# Patient Record
Sex: MALE | Race: WHITE
[De-identification: names, ages, dates, MRNs, and addresses within clinical notes are randomized per-mention and may not be internally consistent; named-entity substitution may affect disease eponyms.]

---

## 2021-06-15 ENCOUNTER — APPOINTMENT (OUTPATIENT)
Dept: OTOLARYNGOLOGY | Facility: CLINIC | Age: 61
End: 2021-06-15
Payer: COMMERCIAL

## 2021-06-15 VITALS
SYSTOLIC BLOOD PRESSURE: 155 MMHG | HEART RATE: 72 BPM | DIASTOLIC BLOOD PRESSURE: 94 MMHG | TEMPERATURE: 97.2 F | HEIGHT: 69 IN | BODY MASS INDEX: 33.33 KG/M2 | WEIGHT: 225 LBS

## 2021-06-15 DIAGNOSIS — H61.20 IMPACTED CERUMEN, UNSPECIFIED EAR: ICD-10-CM

## 2021-06-15 PROBLEM — Z00.00 ENCOUNTER FOR PREVENTIVE HEALTH EXAMINATION: Status: ACTIVE | Noted: 2021-06-15

## 2021-06-15 PROCEDURE — 69210 REMOVE IMPACTED EAR WAX UNI: CPT

## 2021-06-15 PROCEDURE — 99072 ADDL SUPL MATRL&STAF TM PHE: CPT

## 2021-06-15 PROCEDURE — 99203 OFFICE O/P NEW LOW 30 MIN: CPT | Mod: 25

## 2021-06-15 NOTE — REVIEW OF SYSTEMS
[Snoring With Pauses] : snoring with pauses [Patient Intake Form Reviewed] : Patient intake form was reviewed

## 2021-06-15 NOTE — PHYSICAL EXAM
[de-identified] : LEFT IMPACTED CERUMEN REMOVED/ HEARING IMPROVED [Normal] : mucosa is normal [Midline] : trachea located in midline position

## 2022-05-02 ENCOUNTER — APPOINTMENT (OUTPATIENT)
Dept: ULTRASOUND IMAGING | Facility: CLINIC | Age: 62
End: 2022-05-02
Payer: COMMERCIAL

## 2022-05-02 ENCOUNTER — APPOINTMENT (OUTPATIENT)
Dept: RADIOLOGY | Facility: CLINIC | Age: 62
End: 2022-05-02
Payer: COMMERCIAL

## 2022-05-02 PROCEDURE — 71046 X-RAY EXAM CHEST 2 VIEWS: CPT

## 2022-05-02 PROCEDURE — 76700 US EXAM ABDOM COMPLETE: CPT

## 2023-03-11 ENCOUNTER — APPOINTMENT (OUTPATIENT)
Dept: ULTRASOUND IMAGING | Facility: CLINIC | Age: 63
End: 2023-03-11
Payer: COMMERCIAL

## 2023-03-11 ENCOUNTER — APPOINTMENT (OUTPATIENT)
Dept: RADIOLOGY | Facility: CLINIC | Age: 63
End: 2023-03-11
Payer: COMMERCIAL

## 2023-03-11 PROCEDURE — 93971 EXTREMITY STUDY: CPT | Mod: LT

## 2023-03-11 PROCEDURE — 72040 X-RAY EXAM NECK SPINE 2-3 VW: CPT

## 2023-09-28 ENCOUNTER — OFFICE (OUTPATIENT)
Dept: URBAN - METROPOLITAN AREA CLINIC 109 | Facility: CLINIC | Age: 63
Setting detail: OPHTHALMOLOGY
End: 2023-09-28
Payer: COMMERCIAL

## 2023-09-28 DIAGNOSIS — H25.13: ICD-10-CM

## 2023-09-28 DIAGNOSIS — H02.534: ICD-10-CM

## 2023-09-28 DIAGNOSIS — H02.531: ICD-10-CM

## 2023-09-28 DIAGNOSIS — H43.812: ICD-10-CM

## 2023-09-28 DIAGNOSIS — H40.053: ICD-10-CM

## 2023-09-28 PROCEDURE — 92015 DETERMINE REFRACTIVE STATE: CPT | Performed by: OPHTHALMOLOGY

## 2023-09-28 PROCEDURE — 92014 COMPRE OPH EXAM EST PT 1/>: CPT | Performed by: OPHTHALMOLOGY

## 2023-09-28 ASSESSMENT — REFRACTION_MANIFEST
OS_VA1: 20/20-
OD_ADD: +2.75
OS_ADD: +2.75
OS_CYLINDER: -1.50
OS_SPHERE: -5.50
OS_AXIS: 95
OD_CYLINDER: -0.75
OD_SPHERE: -7.25
OD_VA1: 20/20-
OD_AXIS: 110

## 2023-09-28 ASSESSMENT — TONOMETRY
OD_IOP_MMHG: 19
OS_IOP_MMHG: 17

## 2023-09-28 ASSESSMENT — REFRACTION_CURRENTRX
OS_CYLINDER: -1.00
OS_OVR_VA: 20/
OD_OVR_VA: 20/
OD_ADD: +2.50
OS_SPHERE: -6.50
OD_VPRISM_DIRECTION: PROGS
OS_ADD: +2.75
OD_AXIS: 106
OS_AXIS: 093
OD_CYLINDER: -0.75
OD_CYLINDER: -1.00
OD_SPHERE: -7.50
OS_VPRISM_DIRECTION: PROGS
OD_OVR_VA: 20/
OD_SPHERE: -7.25
OS_AXIS: 90
OS_OVR_VA: 20/
OS_CYLINDER: -1.50
OS_ADD: +2.50
OD_ADD: +2.75
OD_AXIS: 72
OS_SPHERE: -6.00

## 2023-09-28 ASSESSMENT — PACHYMETRY
OD_CT_UM: 578
OS_CT_CORRECTION: -2
OS_CT_UM: 577
OD_CT_CORRECTION: -2

## 2023-09-28 ASSESSMENT — REFRACTION_AUTOREFRACTION
OD_CYLINDER: -0.75
OD_AXIS: 109
OS_AXIS: 096
OD_SPHERE: -7.00
OS_SPHERE: -5.00
OS_CYLINDER: -1.50

## 2023-09-28 ASSESSMENT — SPHEQUIV_DERIVED
OS_SPHEQUIV: -5.75
OS_SPHEQUIV: -6.25
OD_SPHEQUIV: -7.375
OD_SPHEQUIV: -7.625

## 2023-09-28 ASSESSMENT — AXIALLENGTH_DERIVED
OS_AL: 24.9923
OD_AL: 25.6692
OD_AL: 25.785
OS_AL: 25.2129

## 2023-09-28 ASSESSMENT — KERATOMETRY
OD_AXISANGLE_DEGREES: 030
OD_K1POWER_DIOPTERS: 46.00
OS_K2POWER_DIOPTERS: 46.50
OS_AXISANGLE_DEGREES: 167
OD_K2POWER_DIOPTERS: 46.25
OS_K1POWER_DIOPTERS: 45.50

## 2023-09-28 ASSESSMENT — CONFRONTATIONAL VISUAL FIELD TEST (CVF)
OS_FINDINGS: FULL
OD_FINDINGS: FULL

## 2023-09-28 ASSESSMENT — VISUAL ACUITY
OD_BCVA: 20/20
OS_BCVA: 20/20

## 2024-03-22 ENCOUNTER — APPOINTMENT (OUTPATIENT)
Dept: ULTRASOUND IMAGING | Facility: CLINIC | Age: 64
End: 2024-03-22
Payer: COMMERCIAL

## 2024-03-22 ENCOUNTER — APPOINTMENT (OUTPATIENT)
Dept: RADIOLOGY | Facility: CLINIC | Age: 64
End: 2024-03-22
Payer: COMMERCIAL

## 2024-03-22 PROCEDURE — 72100 X-RAY EXAM L-S SPINE 2/3 VWS: CPT

## 2024-03-22 PROCEDURE — 72070 X-RAY EXAM THORAC SPINE 2VWS: CPT

## 2024-03-22 PROCEDURE — 76700 US EXAM ABDOM COMPLETE: CPT

## 2024-08-27 ENCOUNTER — APPOINTMENT (OUTPATIENT)
Dept: ORTHOPEDIC SURGERY | Facility: CLINIC | Age: 64
End: 2024-08-27
Payer: MEDICARE

## 2024-08-27 VITALS — HEIGHT: 69 IN | BODY MASS INDEX: 33.62 KG/M2 | WEIGHT: 227 LBS

## 2024-08-27 DIAGNOSIS — I10 ESSENTIAL (PRIMARY) HYPERTENSION: ICD-10-CM

## 2024-08-27 DIAGNOSIS — M75.21 BICIPITAL TENDINITIS, RIGHT SHOULDER: ICD-10-CM

## 2024-08-27 PROCEDURE — 73030 X-RAY EXAM OF SHOULDER: CPT | Mod: RT

## 2024-08-27 PROCEDURE — 73010 X-RAY EXAM OF SHOULDER BLADE: CPT | Mod: RT

## 2024-08-27 PROCEDURE — 99204 OFFICE O/P NEW MOD 45 MIN: CPT

## 2024-08-27 RX ORDER — AMLODIPINE BESYLATE 5 MG/1
TABLET ORAL
Refills: 0 | Status: ACTIVE | COMMUNITY

## 2024-08-27 RX ORDER — TAMSULOSIN HYDROCHLORIDE 0.4 MG/1
0.4 CAPSULE ORAL
Refills: 0 | Status: ACTIVE | COMMUNITY

## 2024-08-27 RX ORDER — DICLOFENAC SODIUM 75 MG/1
75 TABLET, DELAYED RELEASE ORAL TWICE DAILY
Qty: 60 | Refills: 2 | Status: ACTIVE | COMMUNITY
Start: 2024-08-27 | End: 2024-11-25

## 2024-08-27 RX ORDER — ATORVASTATIN CALCIUM 80 MG/1
TABLET, FILM COATED ORAL
Refills: 0 | Status: ACTIVE | COMMUNITY

## 2024-08-27 NOTE — ASSESSMENT
[FreeTextEntry1] : R biceps tendonitis Activity modification. Ice. Trial of PT. Diclofenac BID prn. Consider SA injection vs MRI if not improved. RTO 6 weeks

## 2024-08-27 NOTE — HISTORY OF PRESENT ILLNESS
[7] : 7 [0] : 0 [Dull/Aching] : dull/aching [Rest] : rest [Retired] : Work status: retired [de-identified] : 08/27/2024: 63 yo RHD male presents with right shoulder pain since 2 months ago from lifting heavy bags. Reports pain is in the upper arm, no radiation.  pt has been having weakness and pains ever since. has not seen another dr. pt also having pains in bicep. No numbness or tingling. No injury in the past.  He tried motrin without relief.    PMHx: HLD, HTN  [] : Post Surgical Visit: no [FreeTextEntry1] : right shoulder  [de-identified] : any movement  [de-identified] : none

## 2024-08-27 NOTE — PHYSICAL EXAM
[5 ___] : forward flexion 5[unfilled]/5 [5___] : external rotation 5[unfilled]/5 [Right] : right shoulder [] : negative impingement testing [FreeTextEntry9] :   ER 59

## 2024-09-25 ENCOUNTER — OFFICE (OUTPATIENT)
Dept: URBAN - METROPOLITAN AREA CLINIC 109 | Facility: CLINIC | Age: 64
Setting detail: OPHTHALMOLOGY
End: 2024-09-25
Payer: MEDICARE

## 2024-09-25 DIAGNOSIS — H25.13: ICD-10-CM

## 2024-09-25 DIAGNOSIS — H43.812: ICD-10-CM

## 2024-09-25 DIAGNOSIS — H02.534: ICD-10-CM

## 2024-09-25 DIAGNOSIS — H40.053: ICD-10-CM

## 2024-09-25 DIAGNOSIS — H02.531: ICD-10-CM

## 2024-09-25 PROCEDURE — 92014 COMPRE OPH EXAM EST PT 1/>: CPT | Performed by: OPHTHALMOLOGY

## 2024-09-25 ASSESSMENT — CONFRONTATIONAL VISUAL FIELD TEST (CVF)
OD_FINDINGS: FULL
OS_FINDINGS: FULL

## 2024-10-22 ENCOUNTER — APPOINTMENT (OUTPATIENT)
Dept: ORTHOPEDIC SURGERY | Facility: CLINIC | Age: 64
End: 2024-10-22